# Patient Record
Sex: FEMALE | Race: WHITE
[De-identification: names, ages, dates, MRNs, and addresses within clinical notes are randomized per-mention and may not be internally consistent; named-entity substitution may affect disease eponyms.]

---

## 2018-02-20 ENCOUNTER — HOSPITAL ENCOUNTER (OUTPATIENT)
Dept: HOSPITAL 24 - RAD | Age: 73
End: 2018-02-20
Attending: PSYCHIATRY & NEUROLOGY
Payer: COMMERCIAL

## 2018-02-20 DIAGNOSIS — R41.3: Primary | ICD-10-CM

## 2018-02-20 PROCEDURE — 70551 MRI BRAIN STEM W/O DYE: CPT

## 2018-02-20 NOTE — MRI
HISTORY:  Memory loss, headache, and blurred vision.



Study: MRI brain without contrast.



Comparison: None.  



Technique: Multiplanar multi-sequence MRI of the brain was obtained utilizing standard departmental p
rotocol.  Sagittal and axial T1 weighted images were obtained.  Axial T2 and flair weighted images we
re performed as well.  Axial diffusion weighted and ADC trace mapping was performed.



Findings:



Along the intraventricular septum of the lateral ventricles there is a T1 dark/T2 heterogeneous 2.3 x
 3.9 x 2.4 cm mass that restricts diffusion. This appears to be within the left ventricle and a bowli
ng the intraventricular septum to the right. Extensive vascular flow voids are seen within this lesio
n. Age-related cortical atrophy and chronic small vessel ischemic changes. Otherwise, the midline str
uctures appear unremarkable.  The evaluation of the brain parenchyma demonstrates no abnormal signal 
characteristics to suggest intraparenchymal mass or hemorrhage.  No extra-axial fluid collections are
 observed.  No obvious hydrocephalus. Remaining ventricles are unremarkable. The CP angle is normal i
n its appearance without brainstem mass or evidence for acoustic neuroma.  Remaining flow voids on melchor
th T1 and T2 weighted imaging appear unremarkable.  The extracranial structures are unremarkable.



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
g the interventricular septum to the right. Differential diagnosis includes, but is not limited to: C
entral neurocytoma, intraventricular meningioma, or subependymoma. Recommend Neurosurgery consultatio
n and postcontrast MRI T1 sequences of the brain.







Reported By:Electronically Signed by MIGUE SALVADOR MD at 2/20/2018 3:44:18 PM

## 2018-02-21 ENCOUNTER — HOSPITAL ENCOUNTER (OUTPATIENT)
Dept: HOSPITAL 24 - RAD | Age: 73
End: 2018-02-21
Attending: PSYCHIATRY & NEUROLOGY
Payer: COMMERCIAL

## 2018-02-21 DIAGNOSIS — R41.3: Primary | ICD-10-CM

## 2018-02-21 LAB
BUN SERPL-MCNC: 17 MG/DL (ref 7–18)
CREAT SERPL-MCNC: 0.95 MG/DL (ref 0.55–1.02)

## 2018-02-21 PROCEDURE — 82565 ASSAY OF CREATININE: CPT

## 2018-02-21 PROCEDURE — 84520 ASSAY OF UREA NITROGEN: CPT

## 2018-02-21 PROCEDURE — 36415 COLL VENOUS BLD VENIPUNCTURE: CPT

## 2018-02-21 PROCEDURE — 70552 MRI BRAIN STEM W/DYE: CPT

## 2018-02-21 NOTE — MRI
HISTORY:  Intraventricular brain mass.



Study: MRI brain with contrast.



Comparison: MRI brain dated February 20, 2018.  



Technique: Multiplanar multi-sequence MRI of the brain was obtained after the uneventful administrati
on of 17 cc of Omniscan IV contrast.



Findings:



Post-contrast T1 images demonstrate avid enhancement of the 3.9 cm left central intraventricular mass
. No other areas of abnormal contrast enhancement. The mass demonstrates multiple vascular flow voids
. The remaining brain, soft tissues, and osseous structures appear unchanged.



IMPRESSION: Avid enhancement of the 3.9 cm left central intraventricular mass. No other areas of abno
rmal contrast enhancement. Again, differential diagnosis includes, but is not limited to: Central scott
rocytoma, intraventricular meningioma or subependymoma.







Reported By:Electronically Signed by MIGUE SALVADOR MD at 2/21/2018 4:44:27 PM

## 2018-03-02 ENCOUNTER — HOSPITAL ENCOUNTER (OUTPATIENT)
Dept: HOSPITAL 24 - RT | Age: 73
End: 2018-03-02
Attending: PSYCHIATRY & NEUROLOGY
Payer: COMMERCIAL

## 2018-03-02 DIAGNOSIS — R41.3: Primary | ICD-10-CM

## 2018-03-02 PROCEDURE — 95819 EEG AWAKE AND ASLEEP: CPT
